# Patient Record
Sex: MALE | Race: OTHER | NOT HISPANIC OR LATINO | URBAN - METROPOLITAN AREA
[De-identification: names, ages, dates, MRNs, and addresses within clinical notes are randomized per-mention and may not be internally consistent; named-entity substitution may affect disease eponyms.]

---

## 2018-09-18 ENCOUNTER — OUTPATIENT (OUTPATIENT)
Dept: OUTPATIENT SERVICES | Facility: HOSPITAL | Age: 48
LOS: 1 days | End: 2018-09-18
Payer: COMMERCIAL

## 2018-09-18 VITALS
TEMPERATURE: 98 F | OXYGEN SATURATION: 97 % | HEIGHT: 68.5 IN | HEART RATE: 62 BPM | SYSTOLIC BLOOD PRESSURE: 144 MMHG | WEIGHT: 160.94 LBS | DIASTOLIC BLOOD PRESSURE: 96 MMHG | RESPIRATION RATE: 13 BRPM

## 2018-09-18 VITALS — WEIGHT: 160.94 LBS | HEIGHT: 68.5 IN

## 2018-09-18 DIAGNOSIS — M77.31 CALCANEAL SPUR, RIGHT FOOT: ICD-10-CM

## 2018-09-18 DIAGNOSIS — Z01.818 ENCOUNTER FOR OTHER PREPROCEDURAL EXAMINATION: ICD-10-CM

## 2018-09-18 DIAGNOSIS — M72.2 PLANTAR FASCIAL FIBROMATOSIS: ICD-10-CM

## 2018-09-18 LAB
ANION GAP SERPL CALC-SCNC: 6 MMOL/L — SIGNIFICANT CHANGE UP (ref 5–17)
BUN SERPL-MCNC: 19 MG/DL — SIGNIFICANT CHANGE UP (ref 7–23)
CALCIUM SERPL-MCNC: 9.4 MG/DL — SIGNIFICANT CHANGE UP (ref 8.4–10.5)
CHLORIDE SERPL-SCNC: 106 MMOL/L — SIGNIFICANT CHANGE UP (ref 96–108)
CO2 SERPL-SCNC: 30 MMOL/L — SIGNIFICANT CHANGE UP (ref 22–31)
CREAT SERPL-MCNC: 1.1 MG/DL — SIGNIFICANT CHANGE UP (ref 0.5–1.3)
GLUCOSE SERPL-MCNC: 93 MG/DL — SIGNIFICANT CHANGE UP (ref 70–99)
HCT VFR BLD CALC: 51.7 % — HIGH (ref 39–50)
HGB BLD-MCNC: 16.9 G/DL — SIGNIFICANT CHANGE UP (ref 13–17)
MCHC RBC-ENTMCNC: 27.2 PG — SIGNIFICANT CHANGE UP (ref 27–34)
MCHC RBC-ENTMCNC: 32.6 GM/DL — SIGNIFICANT CHANGE UP (ref 32–36)
MCV RBC AUTO: 83.3 FL — SIGNIFICANT CHANGE UP (ref 80–100)
PLATELET # BLD AUTO: 231 K/UL — SIGNIFICANT CHANGE UP (ref 150–400)
POTASSIUM SERPL-MCNC: 3.9 MMOL/L — SIGNIFICANT CHANGE UP (ref 3.5–5.3)
POTASSIUM SERPL-SCNC: 3.9 MMOL/L — SIGNIFICANT CHANGE UP (ref 3.5–5.3)
RBC # BLD: 6.2 M/UL — HIGH (ref 4.2–5.8)
RBC # FLD: 12 % — SIGNIFICANT CHANGE UP (ref 10.3–14.5)
SODIUM SERPL-SCNC: 142 MMOL/L — SIGNIFICANT CHANGE UP (ref 135–145)
WBC # BLD: 6.2 K/UL — SIGNIFICANT CHANGE UP (ref 3.8–10.5)
WBC # FLD AUTO: 6.2 K/UL — SIGNIFICANT CHANGE UP (ref 3.8–10.5)

## 2018-09-18 PROCEDURE — G0463: CPT

## 2018-09-18 PROCEDURE — 93005 ELECTROCARDIOGRAM TRACING: CPT

## 2018-09-18 PROCEDURE — 36415 COLL VENOUS BLD VENIPUNCTURE: CPT

## 2018-09-18 PROCEDURE — 85027 COMPLETE CBC AUTOMATED: CPT

## 2018-09-18 PROCEDURE — 80048 BASIC METABOLIC PNL TOTAL CA: CPT

## 2018-09-18 PROCEDURE — 93010 ELECTROCARDIOGRAM REPORT: CPT | Mod: NC

## 2018-09-18 NOTE — H&P PST ADULT - NSANTHOSAYNRD_GEN_A_CORE
No. REMA screening performed.  STOP BANG Legend: 0-2 = LOW Risk; 3-4 = INTERMEDIATE Risk; 5-8 = HIGH Risk

## 2018-09-18 NOTE — H&P PST ADULT - ATTENDING COMMENTS
Physician Assistant Statement:       I have interviewed the patient and I have reviewed the chart. There have been no changes in the patient's medical history, medications,   or physical state.    JODIE STINSON  9- 8:14 AM

## 2018-09-18 NOTE — H&P PST ADULT - PROBLEM SELECTOR PLAN 1
Scheduled for heel spur resection right foot and plantar fasciectomy right foot 9/28/18.     Pre-op labs, EKG ordered.  Obtain medical clearance.

## 2018-09-18 NOTE — H&P PST ADULT - HISTORY OF PRESENT ILLNESS
49 y/o male presents to [facility].  He states the bottom of his right foot has been painful for years and he has received injections in the past for pain relief.  Under the care of Dr Gagnon throughout the years.  He is ready to undergo right foot surgery. Scheduled for heel spur resection right foot and plantar fasciectomy right foot 9/28/18.

## 2018-09-18 NOTE — H&P PST ADULT - PMH
Bundle branch block, right  left  Hypertension    Plantar fascial fibromatosis  s/p left fasciectomy 2014  Plantar fascial fibromatosis of right foot

## 2018-09-27 ENCOUNTER — TRANSCRIPTION ENCOUNTER (OUTPATIENT)
Age: 48
End: 2018-09-27

## 2018-09-28 ENCOUNTER — RESULT REVIEW (OUTPATIENT)
Age: 48
End: 2018-09-28

## 2018-09-28 ENCOUNTER — OUTPATIENT (OUTPATIENT)
Dept: OUTPATIENT SERVICES | Facility: HOSPITAL | Age: 48
LOS: 1 days | End: 2018-09-28
Payer: COMMERCIAL

## 2018-09-28 VITALS
HEART RATE: 75 BPM | OXYGEN SATURATION: 99 % | RESPIRATION RATE: 16 BRPM | WEIGHT: 160.94 LBS | TEMPERATURE: 98 F | HEIGHT: 68.5 IN | SYSTOLIC BLOOD PRESSURE: 129 MMHG | DIASTOLIC BLOOD PRESSURE: 86 MMHG

## 2018-09-28 VITALS
TEMPERATURE: 97 F | OXYGEN SATURATION: 100 % | RESPIRATION RATE: 16 BRPM | HEART RATE: 55 BPM | SYSTOLIC BLOOD PRESSURE: 120 MMHG | DIASTOLIC BLOOD PRESSURE: 78 MMHG

## 2018-09-28 DIAGNOSIS — M72.2 PLANTAR FASCIAL FIBROMATOSIS: ICD-10-CM

## 2018-09-28 DIAGNOSIS — M77.31 CALCANEAL SPUR, RIGHT FOOT: ICD-10-CM

## 2018-09-28 PROCEDURE — 88305 TISSUE EXAM BY PATHOLOGIST: CPT | Mod: 26

## 2018-09-28 PROCEDURE — 88305 TISSUE EXAM BY PATHOLOGIST: CPT

## 2018-09-28 PROCEDURE — 28119 REMOVAL OF HEEL SPUR: CPT | Mod: RT,XS

## 2018-09-28 PROCEDURE — 76000 FLUOROSCOPY <1 HR PHYS/QHP: CPT

## 2018-09-28 PROCEDURE — 73620 X-RAY EXAM OF FOOT: CPT

## 2018-09-28 PROCEDURE — 28080 REMOVAL OF FOOT LESION: CPT | Mod: RT

## 2018-09-28 PROCEDURE — 28060 PARTIAL REMOVAL FOOT FASCIA: CPT | Mod: RT

## 2018-09-28 PROCEDURE — 73620 X-RAY EXAM OF FOOT: CPT | Mod: 26,RT

## 2018-09-28 RX ORDER — SODIUM CHLORIDE 9 MG/ML
1000 INJECTION, SOLUTION INTRAVENOUS
Qty: 0 | Refills: 0 | Status: DISCONTINUED | OUTPATIENT
Start: 2018-09-28 | End: 2018-09-28

## 2018-09-28 RX ORDER — LOSARTAN/HYDROCHLOROTHIAZIDE 100MG-25MG
1 TABLET ORAL
Qty: 0 | Refills: 0 | COMMUNITY

## 2018-09-28 RX ORDER — ONDANSETRON 8 MG/1
4 TABLET, FILM COATED ORAL ONCE
Qty: 0 | Refills: 0 | Status: DISCONTINUED | OUTPATIENT
Start: 2018-09-28 | End: 2018-09-28

## 2018-09-28 RX ORDER — NEBIVOLOL HYDROCHLORIDE 5 MG/1
1 TABLET ORAL
Qty: 0 | Refills: 0 | COMMUNITY

## 2018-09-28 RX ORDER — HYDROMORPHONE HYDROCHLORIDE 2 MG/ML
0.5 INJECTION INTRAMUSCULAR; INTRAVENOUS; SUBCUTANEOUS
Qty: 0 | Refills: 0 | Status: DISCONTINUED | OUTPATIENT
Start: 2018-09-28 | End: 2018-09-28

## 2018-09-28 RX ADMIN — HYDROMORPHONE HYDROCHLORIDE 0.5 MILLIGRAM(S): 2 INJECTION INTRAMUSCULAR; INTRAVENOUS; SUBCUTANEOUS at 10:34

## 2018-09-28 RX ADMIN — HYDROMORPHONE HYDROCHLORIDE 0.5 MILLIGRAM(S): 2 INJECTION INTRAMUSCULAR; INTRAVENOUS; SUBCUTANEOUS at 10:44

## 2018-09-28 RX ADMIN — SODIUM CHLORIDE 88 MILLILITER(S): 9 INJECTION, SOLUTION INTRAVENOUS at 10:21

## 2018-09-28 RX ADMIN — SODIUM CHLORIDE 50 MILLILITER(S): 9 INJECTION, SOLUTION INTRAVENOUS at 07:19

## 2018-09-28 NOTE — BRIEF OPERATIVE NOTE - OPERATION/FINDINGS
Right foot excision of neuroma 2nd intermetatarsal space, excision of heel spur and partial plantar fasciectomy

## 2018-09-28 NOTE — ASU DISCHARGE PLAN (ADULT/PEDIATRIC). - SPECIAL INSTRUCTIONS
Ice and elevate the right foot  Keep dressing clean, dry and intact  Nonweightbearing Right foot with CAM walker and crutches

## 2018-09-28 NOTE — ASU DISCHARGE PLAN (ADULT/PEDIATRIC). - MEDICATION SUMMARY - MEDICATIONS TO TAKE
I will START or STAY ON the medications listed below when I get home from the hospital:    losartan-hydrochlorothiazide 100mg-12.5mg oral tablet  -- 1 tab(s) by mouth once a day  -- Indication: For as per PCP    Bystolic 20 mg oral tablet  -- 1 tab(s) by mouth once a day  -- Indication: For as per PCP

## 2018-09-28 NOTE — BRIEF OPERATIVE NOTE - POST-OP DX
Heel spur, right  09/28/2018    Active  Aleksandra Gagnon  Neuroma  09/28/2018    Active  Aleksandra Gagnon  Plantar fasciitis of right foot  09/28/2018    Active  Aleksandra Gagnon

## 2018-09-28 NOTE — ASU DISCHARGE PLAN (ADULT/PEDIATRIC). - NURSING INSTRUCTIONS
All of discharge , safety, pain management, NWB  and use of Crutch, follow up with surgeon. Verbalized understanding

## 2018-09-28 NOTE — BRIEF OPERATIVE NOTE - PROCEDURE
<<-----Click on this checkbox to enter Procedure Plantar fasciectomy  09/28/2018    Active  BLAKE  Excision of neuroma  09/28/2018    Active  BLAKE

## 2018-10-01 LAB — SURGICAL PATHOLOGY STUDY: SIGNIFICANT CHANGE UP

## 2024-03-01 NOTE — ASU PATIENT PROFILE, ADULT - TEACHING/LEARNING DEVELOPMENTAL CONSIDERATIONS
REFERRING PHYSICIAN: Alec Salgado MD    CHIEF COMPLAINT:  The patient presented to [facility] for right knee arthroplasty.    HISTORY OF PRESENT ILLNESS:  The patient is a 64-year-old male with history of hypertension, hyperlipidemia, and osteoarthritis.  He was evaluated by an orthopedic surgeon, Dr. Alec Salgado, and he was scheduled for right knee arthroplasty at [facility] that was done today.  The patient was admitted to general medical floor for postoperative care and pain control.    PAST MEDICAL HISTORY:  The patient's past medical history is significant for hypertension, hyperlipidemia, diabetes mellitus, type 2, diagnosed about 2 years ago.  He had history of peptic ulcer disease in 1992.  The patient had left knee arthroplasty done in 2018.  He has history of lower extremity varicose veins.  No other significant medical or surgical problems.    ALLERGIES:  No known drug allergies.    FAMILY HISTORY:  Noncontributory.    SOCIAL HISTORY:  The patient denies history of smoking.  No history of alcohol or drug abuse.  He is using marijuana at night.    MEDICATIONS:  He is taking,  1. Amlodipine 5 mg daily.  2. Glipizide 10 mg twice a day.  3. Lisinopril 20 mg daily.  4. Metformin 1000 mg twice a day.  5. Prilosec 20 mg daily.  6. Tradjenta 5 mg daily.    PHYSICAL EXAM:  GENERAL:  The patient appears alert and oriented x3, not in acute respiratory or cardiovascular distress.   VITAL SIGNS:  His blood pressure now is 180/83, pulse rate 71, respiratory rate 16, O2 saturation 98%, and temperature 97.2.   HEENT:  Pupils are equal, reactive to light and accommodation.  No jugular vein distention.   HEART:  Heart appears regular rate and rhythm.  Normal S1 and S2.   LUNGS:  Clear to auscultation bilaterally.  No wheezing or crackles.   ABDOMEN:  Soft, nontender, nondistended.  No hepatosplenomegaly.   EXTREMITIES:  Extremities showed no lower extremities edema,  status post right total knee arthroplasty.    IMPRESSION:  A 64-year-old male presented to [facility] for right knee arthroplasty.  The patient tolerated the procedure well, and he was admitted to general medical floor for postoperative care and pain control.        Dictated By:  Charles West MD        D:  02/29/2024 13:43:43  T:  02/29/2024 21:08:51  ALBAN/modl  Job:  [id number]      cc:  Alec Salgado MD   none